# Patient Record
Sex: MALE | Race: BLACK OR AFRICAN AMERICAN | NOT HISPANIC OR LATINO | URBAN - METROPOLITAN AREA
[De-identification: names, ages, dates, MRNs, and addresses within clinical notes are randomized per-mention and may not be internally consistent; named-entity substitution may affect disease eponyms.]

---

## 2019-12-28 ENCOUNTER — EMERGENCY (EMERGENCY)
Facility: HOSPITAL | Age: 24
LOS: 1 days | Discharge: HOME | End: 2019-12-28
Admitting: EMERGENCY MEDICINE
Payer: SELF-PAY

## 2019-12-28 VITALS
SYSTOLIC BLOOD PRESSURE: 132 MMHG | RESPIRATION RATE: 16 BRPM | HEART RATE: 95 BPM | TEMPERATURE: 98 F | DIASTOLIC BLOOD PRESSURE: 64 MMHG | OXYGEN SATURATION: 100 %

## 2019-12-28 PROCEDURE — 99283 EMERGENCY DEPT VISIT LOW MDM: CPT | Mod: 25

## 2019-12-28 PROCEDURE — 73130 X-RAY EXAM OF HAND: CPT | Mod: 26,RT

## 2019-12-28 PROCEDURE — 12001 RPR S/N/AX/GEN/TRNK 2.5CM/<: CPT

## 2019-12-28 RX ORDER — TETANUS TOXOID, REDUCED DIPHTHERIA TOXOID AND ACELLULAR PERTUSSIS VACCINE, ADSORBED 5; 2.5; 8; 8; 2.5 [IU]/.5ML; [IU]/.5ML; UG/.5ML; UG/.5ML; UG/.5ML
0.5 SUSPENSION INTRAMUSCULAR ONCE
Refills: 0 | Status: COMPLETED | OUTPATIENT
Start: 2019-12-28 | End: 2019-12-28

## 2019-12-28 NOTE — ED PROVIDER NOTE - OBJECTIVE STATEMENT
23yo M, R hand dominant, presents to the ED c/o R hand laceration to multiple sites. Pt states he tripped over his child's toy at home, falling onto glass coffee table. Pt's hand shattered table, sustaining lacerations and skin avulsion to multiple sites of R hand. Denies numbness or paresthesias of extremities. Tetanus unknown.

## 2019-12-28 NOTE — ED ADULT TRIAGE NOTE - CHIEF COMPLAINT QUOTE
laceration to right hand    pt had mechanical trop and fall...  cut hand on a glass table... states has laceration  to right 3rd and 4th fingers and side of right hand.  bleeding controlled.

## 2019-12-28 NOTE — ED PROVIDER NOTE - NSFOLLOWUPINSTRUCTIONS_ED_ALL_ED_FT
Advance activity as tolerated.  Continue all previously prescribed medications as directed unless otherwise instructed.  Follow up with your primary care physician in 48-72 hours- bring copies of your results.  Return to the ER for worsening or persistent symptoms, and/or ANY NEW OR CONCERNING SYMPTOMS. If you have issues obtaining follow up, please call: [phone] to obtain a doctor or specialist who takes your insurance in your area.  You may call [phone] to make an appointment with the internal medicine clinic.     Apply bacitracin to affected areas on hand, change dressings daily. Return to the ED in 10-14 days for suture removal. If any pus, red, warmth or discharge from wounds return as this is sign of infection.

## 2019-12-28 NOTE — ED PROVIDER NOTE - PHYSICAL EXAMINATION
Hand/Skin: Multiple lacerations/Skin avulsion to multiple sites of R hand including large flap-like laceration to ulnar aspect of the R hand, flap like lacs to the dorsal aspect of 3rd/4th fingers and small skin abrasions/avulsions to multiple sites of hand/fingers. Finger strength 5/5, no sensory deficits. no evidence of tendon injury.  strength 5/5

## 2019-12-28 NOTE — ED PROVIDER NOTE - CARE PLAN
Principal Discharge DX:	Hand laceration  Assessment and plan of treatment:	Advance activity as tolerated.  Continue all previously prescribed medications as directed unless otherwise instructed.  Follow up with your primary care physician in 48-72 hours- bring copies of your results.  Return to the ER for worsening or persistent symptoms, and/or ANY NEW OR CONCERNING SYMPTOMS. If you have issues obtaining follow up, please call: [phone] to obtain a doctor or specialist who takes your insurance in your area.  You may call [phone] to make an appointment with the internal medicine clinic.  Secondary Diagnosis:	Skin avulsion  Secondary Diagnosis:	Hand abrasion

## 2019-12-28 NOTE — ED PROVIDER NOTE - PLAN OF CARE
Advance activity as tolerated.  Continue all previously prescribed medications as directed unless otherwise instructed.  Follow up with your primary care physician in 48-72 hours- bring copies of your results.  Return to the ER for worsening or persistent symptoms, and/or ANY NEW OR CONCERNING SYMPTOMS. If you have issues obtaining follow up, please call: [phone] to obtain a doctor or specialist who takes your insurance in your area.  You may call [phone] to make an appointment with the internal medicine clinic.

## 2019-12-28 NOTE — ED PROVIDER NOTE - CLINICAL SUMMARY MEDICAL DECISION MAKING FREE TEXT BOX
A:  -Multiple hand lacerations/skin avulsions/skin abrasions  P:  -XR hand - no foreign body, lac repaired at bedside, remainder of lacs/abrasions dressed and bacitracin applied

## 2019-12-29 VITALS
SYSTOLIC BLOOD PRESSURE: 137 MMHG | OXYGEN SATURATION: 98 % | TEMPERATURE: 98 F | HEART RATE: 85 BPM | RESPIRATION RATE: 18 BRPM | DIASTOLIC BLOOD PRESSURE: 79 MMHG

## 2019-12-29 RX ADMIN — TETANUS TOXOID, REDUCED DIPHTHERIA TOXOID AND ACELLULAR PERTUSSIS VACCINE, ADSORBED 0.5 MILLILITER(S): 5; 2.5; 8; 8; 2.5 SUSPENSION INTRAMUSCULAR at 00:15

## 2019-12-29 NOTE — ED PROCEDURE NOTE - CPROC ED ANATOMIC LOCATION1
Hand/Skin: Multiple lacerations/Skin avulsion to multiple sites of R hand including large flap-like laceration to ulnar aspect of the R hand, flap like lacs to the dorsal aspect of 3rd/4th fingers and small skin abrasions/avulsions to multiple sites of hand/fingers. Finger strength 5/5, no sensory deficits. no evidence of tendon injury.  strength

## 2021-05-12 NOTE — ED PROVIDER NOTE - CCCP TRG CHIEF CMPLNT
Advised to use Ativan sparingly  
Follow up with GI yearly   
Hypertension is improving with treatment.  Continue current treatment regimen.  Dietary sodium restriction.  Regular aerobic exercise.  Blood pressure will be reassessed at the next regular appointment.  
Lipid abnormalities stable - will restart crestor for cardiovascular prevention.     Check levels today.   
Stable   Check lab for ongoing therapeutic drug monitoring   
lacerations